# Patient Record
Sex: MALE | URBAN - METROPOLITAN AREA
[De-identification: names, ages, dates, MRNs, and addresses within clinical notes are randomized per-mention and may not be internally consistent; named-entity substitution may affect disease eponyms.]

---

## 2020-03-03 ENCOUNTER — APPOINTMENT (RX ONLY)
Dept: URBAN - METROPOLITAN AREA CLINIC 139 | Facility: CLINIC | Age: 80
Setting detail: DERMATOLOGY
End: 2020-03-03

## 2020-03-03 DIAGNOSIS — L90.5 SCAR CONDITIONS AND FIBROSIS OF SKIN: ICD-10-CM

## 2020-03-03 DIAGNOSIS — D18.0 HEMANGIOMA: ICD-10-CM

## 2020-03-03 DIAGNOSIS — L73.8 OTHER SPECIFIED FOLLICULAR DISORDERS: ICD-10-CM

## 2020-03-03 DIAGNOSIS — L82.1 OTHER SEBORRHEIC KERATOSIS: ICD-10-CM

## 2020-03-03 DIAGNOSIS — L81.4 OTHER MELANIN HYPERPIGMENTATION: ICD-10-CM

## 2020-03-03 PROBLEM — D18.01 HEMANGIOMA OF SKIN AND SUBCUTANEOUS TISSUE: Status: ACTIVE | Noted: 2020-03-03

## 2020-03-03 PROCEDURE — 99214 OFFICE O/P EST MOD 30 MIN: CPT

## 2020-03-03 ASSESSMENT — LOCATION DETAILED DESCRIPTION DERM
LOCATION DETAILED: LEFT SUPERIOR UPPER BACK
LOCATION DETAILED: LEFT DISTAL DORSAL FOREARM
LOCATION DETAILED: LEFT PROXIMAL DORSAL FOREARM
LOCATION DETAILED: RIGHT PROXIMAL DORSAL FOREARM
LOCATION DETAILED: LEFT CENTRAL FRONTAL SCALP
LOCATION DETAILED: LEFT FOREHEAD
LOCATION DETAILED: LEFT CENTRAL PARIETAL SCALP
LOCATION DETAILED: PERIUMBILICAL SKIN
LOCATION DETAILED: LEFT LATERAL INFERIOR CHEST
LOCATION DETAILED: LEFT INFERIOR UPPER BACK
LOCATION DETAILED: RIGHT MEDIAL SUPERIOR CHEST
LOCATION DETAILED: LEFT MEDIAL FOREHEAD

## 2020-03-03 ASSESSMENT — LOCATION SIMPLE DESCRIPTION DERM
LOCATION SIMPLE: LEFT SCALP
LOCATION SIMPLE: LEFT FOREARM
LOCATION SIMPLE: LEFT UPPER BACK
LOCATION SIMPLE: SCALP
LOCATION SIMPLE: ABDOMEN
LOCATION SIMPLE: LEFT FOREHEAD
LOCATION SIMPLE: CHEST
LOCATION SIMPLE: RIGHT FOREARM

## 2020-03-03 ASSESSMENT — LOCATION ZONE DERM
LOCATION ZONE: FACE
LOCATION ZONE: TRUNK
LOCATION ZONE: SCALP
LOCATION ZONE: ARM

## 2020-03-03 NOTE — PROCEDURE: MIPS QUALITY
Quality 402: Tobacco Use And Help With Quitting Among Adolescents: Patient screened for tobacco and is an ex-smoker
Quality 155 (Denominator): Falls Plan Of Care: Plan of Care not Documented, Reason not Otherwise Specified
Quality 154 Part A: Falls: Risk Assessment (Should Be Reported With Measure 155.): Falls risk assessment completed and documented in the past 12 months.
Quality 154 Part B: Falls: Risk Screening (Should Be Reported With Measure 155.): Patient screened for future fall risk; documentation of no falls in the past year or only one fall without injury in the past year
Detail Level: Detailed
Quality 431: Preventive Care And Screening: Unhealthy Alcohol Use - Screening: Patient screened for unhealthy alcohol use using a single question and scores less than 2 times per year
Quality 111:Pneumonia Vaccination Status For Older Adults: Pneumococcal Vaccination Previously Received
Quality 226: Preventive Care And Screening: Tobacco Use: Screening And Cessation Intervention: Patient screened for tobacco use and is an ex/non-smoker
Quality 110: Preventive Care And Screening: Influenza Immunization: Influenza Immunization Administered during Influenza season
Quality 47: Advance Care Plan: Advance Care Planning discussed and documented; advance care plan or surrogate decision maker documented in the medical record.
Quality 131: Pain Assessment And Follow-Up: Pain assessment using a standardized tool is documented as negative, no follow-up plan required

## 2020-03-03 NOTE — PROCEDURE: REASSURANCE
Detail Level: Generalized
Hide Additional Notes?: No
Include Location In Plan?: Yes
Detail Level: Detailed
Additional Notes (Optional): Status post hernia surgery, slight crust.

## 2021-03-10 ENCOUNTER — OFFICE VISIT (OUTPATIENT)
Dept: URBAN - METROPOLITAN AREA CLINIC 7 | Facility: CLINIC | Age: 81
End: 2021-03-10
Payer: MEDICARE

## 2021-03-10 DIAGNOSIS — H33.312 HORSESHOE TEAR OF RETINA WITHOUT DETACHMENT, LEFT EYE: ICD-10-CM

## 2021-03-10 DIAGNOSIS — H25.12 AGE-RELATED NUCLEAR CATARACT, LEFT EYE: ICD-10-CM

## 2021-03-10 PROCEDURE — 92201 OPSCPY EXTND RTA DRAW UNI/BI: CPT | Performed by: OPHTHALMOLOGY

## 2021-03-10 PROCEDURE — 92014 COMPRE OPH EXAM EST PT 1/>: CPT | Performed by: OPHTHALMOLOGY

## 2021-03-10 PROCEDURE — 92134 CPTRZ OPH DX IMG PST SGM RTA: CPT | Performed by: OPHTHALMOLOGY

## 2021-03-10 ASSESSMENT — INTRAOCULAR PRESSURE
OS: 11
OD: 13

## 2021-03-10 NOTE — IMPRESSION/PLAN
Impression: Age-related nuclear cataract, left eye: H25.12. Plan: Followed by Dr. Janifer Libman. Patient is cleared from retina standpoint to proceed with surgery.

## 2021-03-10 NOTE — IMPRESSION/PLAN
Impression: H/o RT, OS
s/p laser retinopexy 04/13/16 Plan: Patient notes new floaters OS. No new holes, tears, or detachments. RDW.

## 2021-03-10 NOTE — IMPRESSION/PLAN
Impression: Retinal edema OD. Status: Symptomatic- persistent
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
- no improvement with PF
-s/p Avastin -> no improvement in CME
-s/p Kenalog injection last 03/29/17--recurrence at 2mo Plan: Edema improving after second PPV. Observe for now.

## 2021-03-10 NOTE — IMPRESSION/PLAN
Impression: H/o Puckering of macula, right eye: H35.371. OD. Status: Symptomatic.
s/p  PPV, MP/ILM, ICG, IVK, EL(07/11/17) Plan: ERM peeled. There is CME and re-growth superior the fovea OD which is stable with observation. Good surgical outcome. Recommend continued observation. Patient to call with any sudden vision changes.  

RTC: 6-9 months DFE with OCT OU

## 2021-10-06 ENCOUNTER — OFFICE VISIT (OUTPATIENT)
Dept: URBAN - METROPOLITAN AREA CLINIC 7 | Facility: CLINIC | Age: 81
End: 2021-10-06
Payer: MEDICARE

## 2021-10-06 DIAGNOSIS — H40.9 GLAUCOMA: ICD-10-CM

## 2021-10-06 DIAGNOSIS — H33.8 OTHER RETINAL DETACHMENTS: ICD-10-CM

## 2021-10-06 DIAGNOSIS — H35.81 RETINAL EDEMA: ICD-10-CM

## 2021-10-06 DIAGNOSIS — H35.371 PUCKERING OF MACULA, RIGHT EYE: Primary | ICD-10-CM

## 2021-10-06 PROCEDURE — 92134 CPTRZ OPH DX IMG PST SGM RTA: CPT | Performed by: OPHTHALMOLOGY

## 2021-10-06 PROCEDURE — 92014 COMPRE OPH EXAM EST PT 1/>: CPT | Performed by: OPHTHALMOLOGY

## 2021-10-06 ASSESSMENT — INTRAOCULAR PRESSURE
OD: 13
OS: 13

## 2021-10-06 NOTE — IMPRESSION/PLAN
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 Plan: SRF remains stable OD inferiorly behind laser scars.   Warning symptoms discussed

## 2021-10-06 NOTE — IMPRESSION/PLAN
Impression: PCIOL, OD
s/p YAG
-s/p CE/PCIOl OS- targeted for near vision Plan: Patient cleared from retina standpoint for an updated Mrx.

## 2022-04-06 ENCOUNTER — OFFICE VISIT (OUTPATIENT)
Dept: URBAN - METROPOLITAN AREA CLINIC 7 | Facility: CLINIC | Age: 82
End: 2022-04-06
Payer: COMMERCIAL

## 2022-04-06 DIAGNOSIS — Z96.1 PRESENCE OF INTRAOCULAR LENS: ICD-10-CM

## 2022-04-06 PROCEDURE — 92134 CPTRZ OPH DX IMG PST SGM RTA: CPT | Performed by: OPHTHALMOLOGY

## 2022-04-06 PROCEDURE — 92014 COMPRE OPH EXAM EST PT 1/>: CPT | Performed by: OPHTHALMOLOGY

## 2022-04-06 ASSESSMENT — INTRAOCULAR PRESSURE
OS: 9
OD: 8

## 2022-04-06 NOTE — IMPRESSION/PLAN
Impression: H/o Puckering of macula, right eye: H35.371. OD. Status: Symptomatic.
s/p  PPV, MP/ILM, ICG, IVK, EL(07/11/17) Plan: ERM peeled. There is CME and re-growth superior the fovea OD which is stable with observation. Good surgical outcome. Recommend continued observation. Patient to call with any sudden vision changes.  

RTC: 12 months DFE with OCT OU

## 2022-04-06 NOTE — IMPRESSION/PLAN
Impression: PCIOL, OD
s/p YAG
-s/p CE/PCIOl OS- targeted for near vision Plan: He has functioned better without fully correcting OD.

## 2023-05-09 ENCOUNTER — OFFICE VISIT (OUTPATIENT)
Dept: URBAN - METROPOLITAN AREA CLINIC 13 | Facility: CLINIC | Age: 83
End: 2023-05-09
Payer: COMMERCIAL

## 2023-05-09 DIAGNOSIS — H33.8 OTHER RETINAL DETACHMENTS: ICD-10-CM

## 2023-05-09 DIAGNOSIS — H40.9 GLAUCOMA: ICD-10-CM

## 2023-05-09 DIAGNOSIS — H33.312 HORSESHOE TEAR OF RETINA WITHOUT DETACHMENT, LEFT EYE: ICD-10-CM

## 2023-05-09 DIAGNOSIS — Z96.1 PRESENCE OF INTRAOCULAR LENS: ICD-10-CM

## 2023-05-09 DIAGNOSIS — H35.371 PUCKERING OF MACULA, RIGHT EYE: Primary | ICD-10-CM

## 2023-05-09 DIAGNOSIS — H35.81 RETINAL EDEMA: ICD-10-CM

## 2023-05-09 PROCEDURE — 92134 CPTRZ OPH DX IMG PST SGM RTA: CPT | Performed by: OPHTHALMOLOGY

## 2023-05-09 PROCEDURE — 92012 INTRM OPH EXAM EST PATIENT: CPT | Performed by: OPHTHALMOLOGY

## 2023-05-09 ASSESSMENT — INTRAOCULAR PRESSURE
OS: 10
OD: 11

## 2023-05-09 NOTE — IMPRESSION/PLAN
Impression: PCIOL, OU
s/p YAG
-s/p CE/PCIOl OS- targeted for near vision Plan: Patient having difficulty with the aneisokonia. Rec f/u with Dr. Zonia Oden. Discussed CL vs LASIK vs IOL exchange.